# Patient Record
(demographics unavailable — no encounter records)

---

## 2017-05-03 NOTE — REP
FIRST TRIMESTER ULTRASOUND:

 

Real-time sonographic evaluation of the pelvis performed utilizing transabdominal

and endovaginal technique.

 

The uterus measures 8.7 x 3.8 x 5.4 cm.  The endometrial thickness is 11 mm.

Tiny area of fluid in the endometrial canal measures 3 x 2 x 5 mm with a mean

diameter of 3 mm which would correspond to an estimated gestational age of 5

weeks if this does represent a tiny gestational sac.  Right ovary measures 3.9 x

2.1 x 2.5 cm and the left ovary 3.1 x 1.7 x 2.4 cm. There is no adnexal mass or

free fluid. There is no evidence of ovarian torsion with blood flow seen in each

ovary with duplex Doppler evaluation.  Cystic structure of the right ovary may

represent a corpus luteum 1.4 cm in diameter. ?

 

IMPRESSION:

 

Tiny area of fluid in the endometrial canal 3 mm in diameter possibly

representing a tiny gestational sac.  However, differential diagnosis also

includes missed  or ectopic pregnancy.  Suggest correlation with serial

quantitative beta hCG values. There is no evidence of adnexal mass or free fluid

and no evidence of ovarian torsion.

 

 

Signed by

Chris Quijano MD 2017 04:47 P